# Patient Record
Sex: MALE | Race: WHITE | Employment: FULL TIME | URBAN - METROPOLITAN AREA
[De-identification: names, ages, dates, MRNs, and addresses within clinical notes are randomized per-mention and may not be internally consistent; named-entity substitution may affect disease eponyms.]

---

## 2019-07-26 LAB
AVERAGE GLUCOSE: NORMAL
CHOLESTEROL, TOTAL: 167 MG/DL
CHOLESTEROL/HDL RATIO: 4.1
CREATININE: 1 MG/DL
HBA1C MFR BLD: 5.2 %
HDLC SERPL-MCNC: 41 MG/DL (ref 35–70)
LDL CHOLESTEROL CALCULATED: 105 MG/DL (ref 0–160)
POTASSIUM (K+): 4.5
TRIGL SERPL-MCNC: 115 MG/DL
VLDLC SERPL CALC-MCNC: NORMAL MG/DL

## 2020-03-31 VITALS — HEART RATE: 82 BPM | SYSTOLIC BLOOD PRESSURE: 120 MMHG | RESPIRATION RATE: 12 BRPM | DIASTOLIC BLOOD PRESSURE: 70 MMHG

## 2021-01-04 ENCOUNTER — TELEPHONE (OUTPATIENT)
Dept: ADMINISTRATIVE | Age: 56
End: 2021-01-04

## 2021-01-04 DIAGNOSIS — M79.645 PAIN OF LEFT THUMB: Primary | ICD-10-CM

## 2021-01-04 NOTE — TELEPHONE ENCOUNTER
Pt called needing a referral to Dr. Cassie Vivar (Ortho) for injured tendon to L thumb. Pt is already scheduled but need the referral sent over to Dr. Cassie Vivar. Please advise Pt.

## 2021-01-07 DIAGNOSIS — M79.642 LEFT HAND PAIN: Primary | ICD-10-CM

## 2021-01-08 ENCOUNTER — HOSPITAL ENCOUNTER (OUTPATIENT)
Age: 56
Discharge: HOME | End: 2021-01-08
Payer: COMMERCIAL

## 2021-01-08 ENCOUNTER — OFFICE VISIT (OUTPATIENT)
Dept: ORTHOPEDIC SURGERY | Age: 56
End: 2021-01-08
Payer: COMMERCIAL

## 2021-01-08 VITALS — BODY MASS INDEX: 32.51 KG/M2 | HEIGHT: 72 IN | WEIGHT: 240 LBS | TEMPERATURE: 98 F

## 2021-01-08 DIAGNOSIS — M65.312 TRIGGER FINGER OF LEFT THUMB: Primary | ICD-10-CM

## 2021-01-08 DIAGNOSIS — M65.312 TRIGGER FINGER OF LEFT THUMB: ICD-10-CM

## 2021-01-08 PROCEDURE — U0003 INFECTIOUS AGENT DETECTION BY NUCLEIC ACID (DNA OR RNA); SEVERE ACUTE RESPIRATORY SYNDROME CORONAVIRUS 2 (SARS-COV-2) (CORONAVIRUS DISEASE [COVID-19]), AMPLIFIED PROBE TECHNIQUE, MAKING USE OF HIGH THROUGHPUT TECHNOLOGIES AS DESCRIBED BY CMS-2020-01-R: HCPCS

## 2021-01-08 PROCEDURE — 99203 OFFICE O/P NEW LOW 30 MIN: CPT | Performed by: ORTHOPAEDIC SURGERY

## 2021-01-08 NOTE — PROGRESS NOTES
Everitt Lesches is a 48 y.o. male, who presents   Chief Complaint   Patient presents with    Hand Pain     Left Hand, Thumb pain, states of pain x 6 weeks, no known injury. HPI[de-identified] Left thumb pain is been present for some time. The thumb will snap or click and is very bothersome. It will wake him up from sleep at night. He has been using his splint to keep his IP joint straight which seems to help prevent the triggering at night in the waking problems. Is right-hand dominant. He is still able to perform his job as a  for [employer]. Allergies; medications; past medical, surgical, family, and social history; and problem list have been reviewed today and updated as indicated in this encounter - see below following Ortho specifics. Musculoskeletal: Skin condition gross neurovascular function are good in the left upper extremity. Shoulder elbow and wrist motion are good. There is a prominence at the base of the thumb on the volar side in the area of the flexor tendons and the A1 band. The thumb range of motion is limited somewhat. There is tenderness palpation. There is crepitus with motion of the IP joint and the flexor tendon consistent with trigger finger. The remaining fingers are uninvolved. Radiologic Studies: Plain imaging of the left hand shows no abnormalities in the bones. ASSESSMENT:  Gilmer Penn was seen today for hand pain. Diagnoses and all orders for this visit:    Trigger finger of left thumb  -     COVID-19 Ambulatory;  Future Treatment alternatives were reviewed including medical and physical therapies, injections, and surgical options, expected risks benefits and likely outcome of each were discussed in detail, questions asked and answered and understood. We discussed the symptoms as well as physical findings and imaging results. I described the pathology of the triggering and treatment options which are injection or surgical release of the A1 band. Patient seemed to prefer the definitive treatment which she is surgical.    PLAN: Outpatient surgical flexor tenovaginotomy left thumb. The patient was counseled at length about the risks of joaquin Covid-19 during their perioperative period and any recovery window from their procedure. The patient was made aware that joaquin Covid-19  may worsen their prognosis for recovering from their procedure  and lend to a higher morbidity and/or mortality risk. All material risks, benefits, and reasonable alternatives including postponing the procedure were discussed. The patient does wish to proceed with the procedure at this time. There is no problem list on file for this patient. Past Medical History:   Diagnosis Date    Arnold-Chiari malformation ([facility] [address]     Sleep apnea        Past Surgical History:   Procedure Laterality Date    KNEE ARTHROPLASTY Left 10/2009       Current Outpatient Medications   Medication Sig Dispense Refill    ibuprofen (ADVIL;MOTRIN) 200 MG tablet Take 400 mg by mouth every 6 hours as needed for Pain.  ibuprofen (IBU) 800 MG tablet Take 1 tablet by mouth every 8 hours as needed for Pain for 10 days. 30 tablet 0    ibuprofen (IBU) 600 MG tablet Take 1 PO Q 6-8 hrs prn Pain / Swelling / Fever. Take WITH food / meals. 40 tablet 0     No current facility-administered medications for this visit.         No Known Allergies    Social History     Socioeconomic History    Marital status:      Spouse name: None  Number of children: None    Years of education: None    Highest education level: None   Occupational History    None   Social Needs    Financial resource strain: None    Food insecurity     Worry: None     Inability: None    Transportation needs     Medical: None     Non-medical: None   Tobacco Use    Smoking status: Former Smoker     Quit date:      Years since quittin.0    Smokeless tobacco: Former User     Types: Chew   Substance and Sexual Activity    Alcohol use: No    Drug use: No    Sexual activity: Yes     Partners: Female   Lifestyle    Physical activity     Days per week: None     Minutes per session: None    Stress: None   Relationships    Social connections     Talks on phone: None     Gets together: None     Attends [employer] service: None     Active member of club or organization: None     Attends meetings of clubs or organizations: None     Relationship status: None    Intimate partner violence     Fear of current or ex partner: None     Emotionally abused: None     Physically abused: None     Forced sexual activity: None   Other Topics Concern    None   Social History Narrative    None       Family History   Problem Relation Age of Onset    Pancreatic Cancer Mother     No Known Problems Father     Diabetes Brother          Review of Systems:   As follows except as previously noted in HPI:  Constitutional: Negative for chills, diaphoresis,  fever   Respiratory: Negative for cough, shortness of breath and wheezing. Cardiovascular: Negative for chest pain and palpitations. Neurological: Negative for dizziness, syncope,   GI / : abdominal pain or cramping  Musculoskeletal: see HPI       Objective:   Physical Exam   Constitutional: Oriented to person, place, and time. and appears well-developed and well-nourished. :   Head: Normocephalic and atraumatic. Neck: Neck supple.   Eyes: EOM are normal. Pulmonary/Chest: Effort normal.  No respiratory distress, no wheezes. Neurological: Alert and oriented to person  Skin: Skin is warm and dry. Melinda Salinas DO    1/8/21  11:21 AM    All reasonable efforts have been made to minimize the risk of errors that may occur in the use of voice recognition and other electronic means of charting.

## 2021-01-09 LAB
SARS-COV-2: NOT DETECTED
SOURCE: NORMAL

## 2021-01-13 ENCOUNTER — ANESTHESIA EVENT (OUTPATIENT)
Dept: OPERATING ROOM | Age: 56
End: 2021-01-13
Payer: COMMERCIAL

## 2021-01-13 NOTE — ANESTHESIA PRE PROCEDURE
Department of Anesthesiology  Preprocedure Note       Name:  Addie Anguiano   Age:  48 y.o.  :  1965                                          MRN:  [id number]         Date:  2021      Surgeon: Alisha Edwards):  HANS Soto DO    Procedure: Procedure(s):  TRIGGER THUMB-LEFT    Medications prior to admission:   Prior to Admission medications    Not on File       Current medications:    No current facility-administered medications for this encounter. No current outpatient medications on file. Allergies:  No Known Allergies    Problem List:  There is no problem list on file for this patient. Past Medical History:        Diagnosis Date    Arnold-Chiari malformation ([address]     had correction surgery age 14    Sleep apnea     uses CPAP       Past Surgical History:        Procedure Laterality Date    COLONOSCOPY      x2    KNEE ARTHROSCOPY Left 10/2009    OTHER SURGICAL HISTORY  age 14    arnold-chiari malformation correction surgery    TONSILLECTOMY         Social History:    Social History     Tobacco Use    Smoking status: Former Smoker     Years: 10.00     Types: Cigarettes     Quit date:      Years since quittin.0    Smokeless tobacco: Former User     Types: Chew   Substance Use Topics    Alcohol use: No                                Counseling given: Not Answered      Vital Signs (Current):   Vitals:    21 1334   Weight: 240 lb (108.9 kg)   Height: 6' (1.829 m)                                              BP Readings from Last 3 Encounters:   19 120/70   17 131/70   17 131/70       NPO Status:                                                                                 BMI:   Wt Readings from Last 3 Encounters:   21 240 lb (108.9 kg)   17 254 lb 12.8 oz (115.6 kg)   17 240 lb (108.9 kg)     Body mass index is 32.55 kg/m².     CBC: No results found for: WBC, RBC, HGB, HCT, MCV, RDW, PLT    CMP:   Lab Results Component Value Date    K 4.5 07/26/2019    CREATININE 1.0 07/26/2019       POC Tests: No results for input(s): POCGLU, POCNA, POCK, POCCL, POCBUN, POCHEMO, POCHCT in the last 72 hours. Coags: No results found for: PROTIME, INR, APTT    HCG (If Applicable): No results found for: PREGTESTUR, PREGSERUM, HCG, HCGQUANT     ABGs: No results found for: PHART, PO2ART, XQA3HXT, ZRP4ETJ, BEART, G4NVVJKN     Type & Screen (If Applicable):  No results found for: LABABO, LABRH    Drug/Infectious Status (If Applicable):  No results found for: HIV, HEPCAB    COVID-19 Screening (If Applicable):   Lab Results   Component Value Date    COVID19 Not Detected 01/08/2021         Anesthesia Evaluation  Patient summary reviewed and Nursing notes reviewed no history of anesthetic complications:   Airway: Mallampati: III  TM distance: >3 FB   Neck ROM: full  Mouth opening: > = 3 FB Dental:          Pulmonary:normal exam  breath sounds clear to auscultation  (+) sleep apnea: on CPAP,                            ROS comment: Hx. Of tobacco abuse w/o dx. Of COPD or need of medical therapy   Cardiovascular:  Exercise tolerance: good (>4 METS),   (+) murmur,       ECG reviewed  Rhythm: regular  Rate: normal           Beta Blocker:  Not on Beta Blocker      ROS comment: Pt. Denies anginal symptoms, respiratory difficulties, major physical restrictions or any recent changes in functional capacity and is at baseline. Neuro/Psych:   (+) seizures: well controlled and no interval change,              ROS comment: Arnold-Chiari malformation s/p repair when 14years old complicated by vocal cord paralysis necessitating tracheostomy. Denies residual sequelae. GI/Hepatic/Renal: Neg GI/Hepatic/Renal ROS            Endo/Other: Negative Endo/Other ROS             Pt had no PAT visit       Abdominal:           Vascular: negative vascular ROS.                                      Anesthesia Plan      MAC and regional     ASA 2     (PONV prophylaxis C-19 negative)  Induction: intravenous. MIPS: Postoperative opioids intended and Prophylactic antiemetics administered. Anesthetic plan and risks discussed with patient. Plan discussed with CRNA. Kelsie Robert DO   1/13/2021        Hx. From chart review.   Patient to be seen and examined tomorrow by the DOS anesthesiologist.    Kelsie Robert DO  Staff Anesthesiologist  January 13, 2021  7:17 AM

## 2021-01-14 ENCOUNTER — HOSPITAL ENCOUNTER (OUTPATIENT)
Age: 56
Setting detail: OUTPATIENT SURGERY
Discharge: HOME | End: 2021-01-14
Attending: ORTHOPAEDIC SURGERY | Admitting: ORTHOPAEDIC SURGERY
Payer: COMMERCIAL

## 2021-01-14 ENCOUNTER — ANESTHESIA (OUTPATIENT)
Dept: OPERATING ROOM | Age: 56
End: 2021-01-14
Payer: COMMERCIAL

## 2021-01-14 VITALS
DIASTOLIC BLOOD PRESSURE: 73 MMHG | OXYGEN SATURATION: 95 % | HEIGHT: 72 IN | WEIGHT: 241 LBS | RESPIRATION RATE: 14 BRPM | BODY MASS INDEX: 32.64 KG/M2 | HEART RATE: 74 BPM | TEMPERATURE: 96.6 F | SYSTOLIC BLOOD PRESSURE: 125 MMHG

## 2021-01-14 VITALS
OXYGEN SATURATION: 96 % | SYSTOLIC BLOOD PRESSURE: 111 MMHG | DIASTOLIC BLOOD PRESSURE: 70 MMHG | RESPIRATION RATE: 16 BRPM

## 2021-01-14 DIAGNOSIS — M65.312 TRIGGER FINGER OF LEFT THUMB: Primary | ICD-10-CM

## 2021-01-14 PROCEDURE — 2709999900 HC NON-CHARGEABLE SUPPLY: Performed by: ORTHOPAEDIC SURGERY

## 2021-01-14 PROCEDURE — 26055 INCISE FINGER TENDON SHEATH: CPT | Performed by: ORTHOPAEDIC SURGERY

## 2021-01-14 PROCEDURE — 7100000010 HC PHASE II RECOVERY - FIRST 15 MIN: Performed by: ORTHOPAEDIC SURGERY

## 2021-01-14 PROCEDURE — 6360000002 HC RX W HCPCS: Performed by: NURSE ANESTHETIST, CERTIFIED REGISTERED

## 2021-01-14 PROCEDURE — 3600000002 HC SURGERY LEVEL 2 BASE: Performed by: ORTHOPAEDIC SURGERY

## 2021-01-14 PROCEDURE — 2500000003 HC RX 250 WO HCPCS: Performed by: NURSE ANESTHETIST, CERTIFIED REGISTERED

## 2021-01-14 PROCEDURE — 3600000012 HC SURGERY LEVEL 2 ADDTL 15MIN: Performed by: ORTHOPAEDIC SURGERY

## 2021-01-14 PROCEDURE — 2580000003 HC RX 258: Performed by: ANESTHESIOLOGY

## 2021-01-14 PROCEDURE — 3700000000 HC ANESTHESIA ATTENDED CARE: Performed by: ORTHOPAEDIC SURGERY

## 2021-01-14 PROCEDURE — 7100000011 HC PHASE II RECOVERY - ADDTL 15 MIN: Performed by: ORTHOPAEDIC SURGERY

## 2021-01-14 PROCEDURE — 3700000001 HC ADD 15 MINUTES (ANESTHESIA): Performed by: ORTHOPAEDIC SURGERY

## 2021-01-14 RX ORDER — METOCLOPRAMIDE HYDROCHLORIDE 5 MG/ML
10 INJECTION INTRAMUSCULAR; INTRAVENOUS
Status: DISCONTINUED | OUTPATIENT
Start: 2021-01-14 | End: 2021-01-14 | Stop reason: HOSPADM

## 2021-01-14 RX ORDER — LIDOCAINE HYDROCHLORIDE 5 MG/ML
INJECTION, SOLUTION INFILTRATION; INTRAVENOUS PRN
Status: DISCONTINUED | OUTPATIENT
Start: 2021-01-14 | End: 2021-01-14 | Stop reason: SDUPTHER

## 2021-01-14 RX ORDER — MEPERIDINE HYDROCHLORIDE 25 MG/ML
12.5 INJECTION INTRAMUSCULAR; INTRAVENOUS; SUBCUTANEOUS EVERY 5 MIN PRN
Status: DISCONTINUED | OUTPATIENT
Start: 2021-01-14 | End: 2021-01-14 | Stop reason: HOSPADM

## 2021-01-14 RX ORDER — ACETAMINOPHEN AND CODEINE PHOSPHATE 300; 30 MG/1; MG/1
1 TABLET ORAL EVERY 4 HOURS PRN
Qty: 30 TABLET | Refills: 0 | Status: SHIPPED | OUTPATIENT
Start: 2021-01-14 | End: 2021-01-21

## 2021-01-14 RX ORDER — PROPOFOL 10 MG/ML
INJECTION, EMULSION INTRAVENOUS CONTINUOUS PRN
Status: DISCONTINUED | OUTPATIENT
Start: 2021-01-14 | End: 2021-01-14 | Stop reason: SDUPTHER

## 2021-01-14 RX ORDER — PROCHLORPERAZINE EDISYLATE 5 MG/ML
5 INJECTION INTRAMUSCULAR; INTRAVENOUS
Status: DISCONTINUED | OUTPATIENT
Start: 2021-01-14 | End: 2021-01-14 | Stop reason: HOSPADM

## 2021-01-14 RX ORDER — FENTANYL CITRATE 50 UG/ML
INJECTION, SOLUTION INTRAMUSCULAR; INTRAVENOUS PRN
Status: DISCONTINUED | OUTPATIENT
Start: 2021-01-14 | End: 2021-01-14 | Stop reason: SDUPTHER

## 2021-01-14 RX ORDER — LIDOCAINE HYDROCHLORIDE 20 MG/ML
INJECTION, SOLUTION INTRAVENOUS PRN
Status: DISCONTINUED | OUTPATIENT
Start: 2021-01-14 | End: 2021-01-14 | Stop reason: SDUPTHER

## 2021-01-14 RX ORDER — SODIUM CHLORIDE, SODIUM LACTATE, POTASSIUM CHLORIDE, CALCIUM CHLORIDE 600; 310; 30; 20 MG/100ML; MG/100ML; MG/100ML; MG/100ML
INJECTION, SOLUTION INTRAVENOUS CONTINUOUS
Status: DISCONTINUED | OUTPATIENT
Start: 2021-01-14 | End: 2021-01-14 | Stop reason: HOSPADM

## 2021-01-14 RX ORDER — DIPHENHYDRAMINE HYDROCHLORIDE 50 MG/ML
12.5 INJECTION INTRAMUSCULAR; INTRAVENOUS
Status: DISCONTINUED | OUTPATIENT
Start: 2021-01-14 | End: 2021-01-14 | Stop reason: HOSPADM

## 2021-01-14 RX ORDER — FENTANYL CITRATE 50 UG/ML
25 INJECTION, SOLUTION INTRAMUSCULAR; INTRAVENOUS EVERY 5 MIN PRN
Status: DISCONTINUED | OUTPATIENT
Start: 2021-01-14 | End: 2021-01-14 | Stop reason: HOSPADM

## 2021-01-14 RX ORDER — MIDAZOLAM HYDROCHLORIDE 1 MG/ML
INJECTION INTRAMUSCULAR; INTRAVENOUS PRN
Status: DISCONTINUED | OUTPATIENT
Start: 2021-01-14 | End: 2021-01-14 | Stop reason: SDUPTHER

## 2021-01-14 RX ORDER — HYDROCODONE BITARTRATE AND ACETAMINOPHEN 5; 325 MG/1; MG/1
1 TABLET ORAL
Status: DISCONTINUED | OUTPATIENT
Start: 2021-01-14 | End: 2021-01-14 | Stop reason: HOSPADM

## 2021-01-14 RX ADMIN — PROPOFOL INJECTABLE EMULSION 100 MCG/KG/MIN: 10 INJECTION, EMULSION INTRAVENOUS at 08:29

## 2021-01-14 RX ADMIN — SODIUM CHLORIDE, POTASSIUM CHLORIDE, SODIUM LACTATE AND CALCIUM CHLORIDE: 600; 310; 30; 20 INJECTION, SOLUTION INTRAVENOUS at 07:48

## 2021-01-14 RX ADMIN — FENTANYL CITRATE 50 MCG: 50 INJECTION, SOLUTION INTRAMUSCULAR; INTRAVENOUS at 08:35

## 2021-01-14 RX ADMIN — LIDOCAINE HYDROCHLORIDE 50 ML: 5 INJECTION, SOLUTION INFILTRATION; INTRAVENOUS at 08:29

## 2021-01-14 RX ADMIN — MIDAZOLAM 2 MG: 1 INJECTION INTRAMUSCULAR; INTRAVENOUS at 08:27

## 2021-01-14 RX ADMIN — LIDOCAINE HYDROCHLORIDE 50 MG: 20 INJECTION, SOLUTION INTRAVENOUS at 08:29

## 2021-01-14 RX ADMIN — FENTANYL CITRATE 50 MCG: 50 INJECTION, SOLUTION INTRAMUSCULAR; INTRAVENOUS at 08:29

## 2021-01-14 ASSESSMENT — PAIN SCALES - GENERAL: PAINLEVEL_OUTOF10: 0

## 2021-01-14 ASSESSMENT — PAIN DESCRIPTION - DESCRIPTORS: DESCRIPTORS: ACHING

## 2021-01-14 NOTE — OP NOTE
Operative report        DATE OF PROCEDURE: 1/14/2021     SURGEON: Zachery Gomez    ASSISTANT: None    PREOPERATIVE DIAGNOSIS: Trigger finger left thumb    POSTOPERATIVE DIAGNOSIS: Same    OPERATION: Flexor tenovaginotomy left thumb    ANESTHESIA: IV regional    ESTIMATED BLOOD LOSS: Scant    COMPLICATIONS: None    SPECIMENS: Was sent to pathology    HISTORY: The patient is a 48y.o. year old male with history of above preop diagnosis. I explained the risk, benefits, expected outcome, and alternatives to the procedure. Patient understands and is in agreement. PROCEDURE: The patient was brought the operating room after signs identified. Adequate IV regional anesthetic was administered by anesthesia with the patient supine on the operating table. The left upper extremities and prepped and draped sterile fashion. Incision was made transverse in the flexor crease of the thumb MP joint. This was deepened down through subcu tissue. 2 and half power loupe modification was used throughout the procedure. The neurovascular structures were carefully protected as was the flexor tendon. Incision was made in the A1 band with a scalpel down to the flexor tendon. This opened up the sheath proximally. The A1 band was then released completely throughout its length to the point of its distal extent and release of fluid indicated the sheath had been decompressed as well. The tendon was then brought up out of its bed to ensure complete excursion and and replaced. The wound was then thoroughly irrigated and closed with 6-0 Prolene simple erupted skin sutures. Xeroform gauze and a bulky dressing was applied. The tourniquet was deflated and good circulation turned to the upper extremity. Patient was taken recovery in stable condition. GROSS PATHOLOGY: Thick tight A1 band flexor system left thumb with stenosis of the tendon. Excess of tenosynovial fluid confirming inflammation in the area.     All reasonable efforts

## 2021-01-14 NOTE — H&P
History and Physical      CHIEF COMPLAINT: Left thumb catching and pain    HISTORY OF PRESENT ILLNESS:      Worsening over time    Past Medical History:        Diagnosis Date    Arnold-Chiari malformation ([address]     had correction surgery age 14    Sleep apnea     uses CPAP     Past Surgical History:        Procedure Laterality Date    COLONOSCOPY      x2    KNEE ARTHROSCOPY Left 10/2009    OTHER SURGICAL HISTORY  age 14    arnold-chiari malformation correction surgery    TONSILLECTOMY       Social History:    TOBACCO:   reports that he quit smoking about 23 years ago. His smoking use included cigarettes. He quit after 10.00 years of use. He has quit using smokeless tobacco.  His smokeless tobacco use included chew. ETOH:   reports no history of alcohol use. DRUGS:   reports no history of drug use. Family History:       Problem Relation Age of Onset    Pancreatic Cancer Mother     No Known Problems Father     Diabetes Brother      Medications Prior to Admission:  No medications prior to admission. Allergies:  Patient has no known allergies. CONSTITUTIONAL:  negative for  chills and anorexia  HEENT:  negative for  tinnitus  RESPIRATORY:  negative for  dyspnea and cyanosis  CARDIOVASCULAR:  negative for  palpitations, syncope  GASTROINTESTINAL:  negative for vomiting and hemtochezia  GENITOURINARY:  negative for dysuria  ENDOCRINE:  negative for tremor  MUSCULOSKELETAL:  positive for snapping flexor tendon left thumb  NEUROLOGICAL:  negative for seizures and syncope  BEHAVIOR/PSYCH:  negative for agitated and anxiety    PHYSICAL EXAM:  Ht 6' (1.829 m)   Wt 240 lb (108.9 kg)   BMI 32.55 kg/m²   General appearance:  awake, alert, cooperative, no apparent distress, and appears stated age  Neurologic:  Awake, alert, oriented to name, place and time. Cranial nerves II-XII are grossly intact. Motor is 5 out of 5 bilaterally. Cerebellar finger to nose, heel to shin intact. Sensory is intact.   Babinski down going, Romberg negative, and gait is normal.  Lungs:  No increased work of breathing, good air exchange, clear to auscultation bilaterally, no crackles or wheezing  Heart:  Normal apical impulse, regular rate and rhythm, normal S1 and S2, no S3 or S4, and no murmur noted  Abdomen:  normal bowel sounds  Skin: warm and dry, no rash or erythema  ENT: tympanic membrane, external ear and ear canal normal bilaterally, oropharynx clear and moist with normal mucous membranes  Musculoskeletal: Felton flexor tendon left thumb    General Labs:  CBC: No results found for: WBC, RBC, HGB, HCT, MCV, RDW, PLT  CMP:    Lab Results   Component Value Date    K 4.5 07/26/2019     U/A:  No components found for: Sunday Dias, USPGRAV, UPH, UPROTEIN, UGLUCOSE, UKETONE, UBILI, UBLOOD, [place], UUROBIL, [place], [facility], [place], [facility], [place], [facility], [place]    Radiology: None    ASSESSMENT AND PLAN:    Flexor tenovaginotomy left thumb      Electronically signed by Mario Nichols DO on 1/14/2021 at 7:22 AM

## 2021-01-14 NOTE — ANESTHESIA POSTPROCEDURE EVALUATION
Department of Anesthesiology  Postprocedure Note    Patient: Nallely Ram  MRN: [id number]  YOB: 1965  Date of evaluation: 1/14/2021  Time:  9:13 AM     Procedure Summary     Date: 01/14/21 Room / Location: [address] [place] [address] 01 / [facility]    Anesthesia Start: Yolie Rashid Anesthesia Stop: 0901    Procedure: TRIGGER THUMB-LEFT (Left ) Diagnosis: (LEFT THUMB-TRIGGER THUMB)    Surgeons: Marco Alcala DO Responsible Provider: Cole Bosworth, DO    Anesthesia Type: MAC, regional ASA Status: 2          Anesthesia Type: MAC, regional    Keysha Phase I: Keysha Score: 10    Keysha Phase II: Keysha Score: 9    Last vitals: Reviewed and per EMR flowsheets.        Anesthesia Post Evaluation    Patient location during evaluation: bedside  Patient participation: complete - patient participated  Level of consciousness: awake  Pain score: 2  Airway patency: patent  Nausea & Vomiting: no vomiting and no nausea  Complications: no  Cardiovascular status: hemodynamically stable  Respiratory status: acceptable  Hydration status: stable

## 2021-01-25 ENCOUNTER — OFFICE VISIT (OUTPATIENT)
Dept: ORTHOPEDIC SURGERY | Age: 56
End: 2021-01-25

## 2021-01-25 VITALS — BODY MASS INDEX: 31.94 KG/M2 | HEIGHT: 73 IN | WEIGHT: 241 LBS | TEMPERATURE: 98 F

## 2021-01-25 DIAGNOSIS — M65.312 TRIGGER FINGER OF LEFT THUMB: Primary | ICD-10-CM

## 2021-01-25 PROCEDURE — 99024 POSTOP FOLLOW-UP VISIT: CPT | Performed by: ORTHOPAEDIC SURGERY

## 2021-01-25 NOTE — PROGRESS NOTES
Chief Complaint:   Chief Complaint   Patient presents with    Post-Op Check     left thumb trigger release DOS 1/14/21. doing well        Jordan Hernandes is 11 days postop left thumb trigger finger decompression. He is doing very well. He still has a lump there and little tenderness and swelling. Been no other problems. Allergies; medications; past medical, surgical, family, and social history; and problem list have been reviewed today and updated as indicated in this encounter seen below. Exam: The wound is well approximated. Sutures removed today without incident. Range of motion of the fingers good without catching. Is mild edema in the area as expected. It was explained to him that the prominence in the tendon will gradually decrease and the lump should go away. Radiographs: none    ASSESSMENT:    Demarco Go was seen today for post-op check. Diagnoses and all orders for this visit:    Trigger finger of left thumb        PLAN: Protection of the wound for at least a week and a half with avoidance of strenuous activities to prevent irritation or any wound separation. All looks good now and should continue with proper care. We will follow-up as needed. No follow-ups on file. No current outpatient medications on file. No current facility-administered medications for this visit.         Patient Active Problem List   Diagnosis    Trigger finger of left thumb       Past Medical History:   Diagnosis Date    Arnold-Chiari malformation ([facility] [address]     had correction surgery age 14    Sleep apnea     uses CPAP       Past Surgical History:   Procedure Laterality Date    COLONOSCOPY      x2    FINGER TRIGGER RELEASE Left 01/14/2021    FINGER TRIGGER RELEASE Left 1/14/2021    TRIGGER THUMB-LEFT performed by Michele Michael DO at [address] ARTHROSCOPY Left 10/2009    OTHER SURGICAL HISTORY  age 14    arnold-chiari malformation correction surgery    TONSILLECTOMY No Known Allergies    Social History     Socioeconomic History    Marital status:      Spouse name: None    Number of children: None    Years of education: None    Highest education level: None   Occupational History    None   Social Needs    Financial resource strain: None    Food insecurity     Worry: None     Inability: None    Transportation needs     Medical: None     Non-medical: None   Tobacco Use    Smoking status: Former Smoker     Years: 10.00     Types: Cigarettes     Quit date:      Years since quittin.0    Smokeless tobacco: Former User     Types: Chew   Substance and Sexual Activity    Alcohol use: No    Drug use: No    Sexual activity: Yes     Partners: Female   Lifestyle    Physical activity     Days per week: None     Minutes per session: None    Stress: None   Relationships    Social connections     Talks on phone: None     Gets together: None     Attends Quaker service: None     Active member of club or organization: None     Attends meetings of clubs or organizations: None     Relationship status: None    Intimate partner violence     Fear of current or ex partner: None     Emotionally abused: None     Physically abused: None     Forced sexual activity: None   Other Topics Concern    None   Social History Narrative    None       Review of Systems  As follows except as previously noted in HPI:  Constitutional: Negative for chills, diaphoresis, fatigue, fever and unexpected weight change. Respiratory: Negative for cough, shortness of breath and wheezing. Cardiovascular: Negative for chest pain and palpitations. Neurological: Negative for dizziness, syncope, cephalgia. GI / : negative  Musculoskeletal: see HPI       Objective:   Physical Exam   Constitutional: Oriented to person, place, and time. and appears well-developed and well-nourished. :   Head: Normocephalic and atraumatic. Eyes: EOM are normal.   Neck: Neck supple.

## 2021-09-16 ENCOUNTER — HOSPITAL ENCOUNTER (OUTPATIENT)
Age: 56
Discharge: HOME | End: 2021-09-16
Payer: COMMERCIAL

## 2021-09-16 ENCOUNTER — HOSPITAL ENCOUNTER (OUTPATIENT)
Age: 56
Discharge: HOME | End: 2021-09-18
Payer: COMMERCIAL

## 2021-09-16 ENCOUNTER — HOSPITAL ENCOUNTER (OUTPATIENT)
Dept: GENERAL RADIOLOGY | Age: 56
Discharge: HOME | End: 2021-09-18
Payer: COMMERCIAL

## 2021-09-16 DIAGNOSIS — Z72.0 TOBACCO ABUSE: ICD-10-CM

## 2021-09-16 LAB
ALBUMIN SERPL-MCNC: 4.7 G/DL (ref 3.5–5.2)
ALP BLD-CCNC: 91 U/L (ref 40–129)
ALT SERPL-CCNC: 26 U/L (ref 0–40)
ANION GAP SERPL CALCULATED.3IONS-SCNC: 7 MMOL/L (ref 7–16)
AST SERPL-CCNC: 19 U/L (ref 0–39)
BASOPHILS ABSOLUTE: 0.03 E9/L (ref 0–0.2)
BASOPHILS RELATIVE PERCENT: 0.7 % (ref 0–2)
BILIRUB SERPL-MCNC: 0.4 MG/DL (ref 0–1.2)
BUN BLDV-MCNC: 15 MG/DL (ref 6–20)
CALCIUM SERPL-MCNC: 9.2 MG/DL (ref 8.6–10.2)
CHLORIDE BLD-SCNC: 107 MMOL/L (ref 98–107)
CHOLESTEROL, TOTAL: 202 MG/DL (ref 0–199)
CO2: 30 MMOL/L (ref 22–29)
CREAT SERPL-MCNC: 0.9 MG/DL (ref 0.7–1.2)
EOSINOPHILS ABSOLUTE: 0.16 E9/L (ref 0.05–0.5)
EOSINOPHILS RELATIVE PERCENT: 3.8 % (ref 0–6)
GFR AFRICAN AMERICAN: >60
GFR NON-AFRICAN AMERICAN: >60 ML/MIN/1.73
GLUCOSE BLD-MCNC: 115 MG/DL (ref 74–99)
HCT VFR BLD CALC: 48.5 % (ref 37–54)
HDLC SERPL-MCNC: 48 MG/DL
HEMOGLOBIN: 16.4 G/DL (ref 12.5–16.5)
IMMATURE GRANULOCYTES #: 0.01 E9/L
IMMATURE GRANULOCYTES %: 0.2 % (ref 0–5)
LDL CHOLESTEROL CALCULATED: 138 MG/DL (ref 0–99)
LYMPHOCYTES ABSOLUTE: 1.62 E9/L (ref 1.5–4)
LYMPHOCYTES RELATIVE PERCENT: 38 % (ref 20–42)
MCH RBC QN AUTO: 30.1 PG (ref 26–35)
MCHC RBC AUTO-ENTMCNC: 33.8 % (ref 32–34.5)
MCV RBC AUTO: 89.2 FL (ref 80–99.9)
MONOCYTES ABSOLUTE: 0.4 E9/L (ref 0.1–0.95)
MONOCYTES RELATIVE PERCENT: 9.4 % (ref 2–12)
NEUTROPHILS ABSOLUTE: 2.04 E9/L (ref 1.8–7.3)
NEUTROPHILS RELATIVE PERCENT: 47.9 % (ref 43–80)
PDW BLD-RTO: 12.6 FL (ref 11.5–15)
PLATELET # BLD: 191 E9/L (ref 130–450)
PMV BLD AUTO: 9.1 FL (ref 7–12)
POTASSIUM SERPL-SCNC: 4.8 MMOL/L (ref 3.5–5)
RBC # BLD: 5.44 E12/L (ref 3.8–5.8)
SODIUM BLD-SCNC: 144 MMOL/L (ref 132–146)
T4 TOTAL: 6.8 MCG/DL (ref 4.5–11.7)
TOTAL PROTEIN: 6.9 G/DL (ref 6.4–8.3)
TRIGL SERPL-MCNC: 79 MG/DL (ref 0–149)
TSH SERPL DL<=0.05 MIU/L-ACNC: 2.23 UIU/ML (ref 0.27–4.2)
VLDLC SERPL CALC-MCNC: 16 MG/DL
WBC # BLD: 4.3 E9/L (ref 4.5–11.5)

## 2021-09-16 PROCEDURE — 80061 LIPID PANEL: CPT

## 2021-09-16 PROCEDURE — 36415 COLL VENOUS BLD VENIPUNCTURE: CPT

## 2021-09-16 PROCEDURE — 85025 COMPLETE CBC W/AUTO DIFF WBC: CPT

## 2021-09-16 PROCEDURE — 84443 ASSAY THYROID STIM HORMONE: CPT

## 2021-09-16 PROCEDURE — 71046 X-RAY EXAM CHEST 2 VIEWS: CPT

## 2021-09-16 PROCEDURE — 84436 ASSAY OF TOTAL THYROXINE: CPT

## 2021-09-16 PROCEDURE — 80053 COMPREHEN METABOLIC PANEL: CPT

## 2021-10-25 ENCOUNTER — HOSPITAL ENCOUNTER (OUTPATIENT)
Age: 56
Discharge: HOME | End: 2021-10-25
Payer: COMMERCIAL

## 2021-10-25 LAB
CHOLESTEROL, FASTING: 204 MG/DL (ref 0–199)
GLUCOSE FASTING: 100 MG/DL (ref 74–99)
HBA1C MFR BLD: 5.2 % (ref 4–5.6)
HDLC SERPL-MCNC: 40 MG/DL
LDL CHOLESTEROL CALCULATED: 136 MG/DL (ref 0–99)
TRIGLYCERIDE, FASTING: 139 MG/DL (ref 0–149)
VLDLC SERPL CALC-MCNC: 28 MG/DL

## 2021-10-25 PROCEDURE — 82947 ASSAY GLUCOSE BLOOD QUANT: CPT

## 2021-10-25 PROCEDURE — 83036 HEMOGLOBIN GLYCOSYLATED A1C: CPT

## 2021-10-25 PROCEDURE — 36415 COLL VENOUS BLD VENIPUNCTURE: CPT

## 2021-10-25 PROCEDURE — 80061 LIPID PANEL: CPT

## 2024-08-12 ENCOUNTER — OFFICE VISIT (OUTPATIENT)
Dept: ORTHOPEDIC SURGERY | Age: 59
End: 2024-08-12
Payer: COMMERCIAL

## 2024-08-12 VITALS — WEIGHT: 225 LBS | HEIGHT: 72 IN | BODY MASS INDEX: 30.48 KG/M2

## 2024-08-12 DIAGNOSIS — M25.552 CHRONIC LEFT HIP PAIN: Primary | ICD-10-CM

## 2024-08-12 DIAGNOSIS — M25.552 LEFT HIP PAIN: Primary | ICD-10-CM

## 2024-08-12 DIAGNOSIS — M47.818 ARTHRITIS OF LEFT SACROILIAC JOINT: ICD-10-CM

## 2024-08-12 DIAGNOSIS — G89.29 CHRONIC LEFT HIP PAIN: Primary | ICD-10-CM

## 2024-08-12 PROCEDURE — 99203 OFFICE O/P NEW LOW 30 MIN: CPT | Performed by: ORTHOPAEDIC SURGERY

## 2024-08-12 NOTE — PROGRESS NOTES
Carly Sibley is a 59 y.o. male, who presents   Chief Complaint   Patient presents with    Hip Pain     Left Hip, x couple months, no known recent injury, no previous Left Hip or low back surgery.  States of pain with rotation of the hip.       HPI:: Left hip area pains been present for years overall with worsening over time.  Mr. Sibley indicates his buttock area.  He says riding the car is bad.  Sitting for very long and then moving about afterwards is difficult.  He has some problems on stairs.  Walking itself is not bad.  He is treated with some stretching and is taken some anti-inflammatories, Tylenol and aspirin on occasions    Allergies; medications; past medical, surgical, family, and social history; and problem list have been reviewed today and updated as indicated in this encounter - see below following Ortho specifics.    Musculoskeletal: .  Skin condition and gross neurovascular functions good in the left lower extremity.  Gait is balanced at a slow pace.  He has little difficulty with single-leg stance on the left.  Range of motion of his knee is good with stable collateral cruciate ligaments.  There is no pain associated.    Left hip range of motion is good and grossly equal to that on the right.  There is mild discomfort.  There is more tenderness to palpation around the lower sacroiliac joint with very mild tenderness over the greater trochanter.  There is no palpable or audible crepitus.    Radiologic Studies: X-rays of the left hip and 2 views shows good bony density, the joint space is well-maintained with no hypertrophic marginal changes femoral head or acetabulum.  There is slight density alteration in the cephalad acetabulum and the subchondral bone however this is felt to be normal variation for him.  The sacroiliac joints are not visualized on these films.    ASSESSMENT:  Carly was seen today for hip pain.    Diagnoses and all orders for this visit:    Chronic left hip pain     Treatment

## 2024-10-01 ENCOUNTER — EVALUATION (OUTPATIENT)
Dept: PHYSICAL THERAPY | Age: 59
End: 2024-10-01
Payer: COMMERCIAL

## 2024-10-01 DIAGNOSIS — M25.552 CHRONIC LEFT HIP PAIN: Primary | ICD-10-CM

## 2024-10-01 DIAGNOSIS — G89.29 CHRONIC LEFT HIP PAIN: Primary | ICD-10-CM

## 2024-10-01 PROCEDURE — 97163 PT EVAL HIGH COMPLEX 45 MIN: CPT | Performed by: PHYSICAL THERAPIST

## 2024-10-01 NOTE — PROGRESS NOTES
index is 30.52 kg/m² as calculated from the following:    Height as of 8/12/24: 1.829 m (6').    Weight as of 8/12/24: 102.1 kg (225 lb).     Observations: well nourished male, Class 1 obesity (BMI of 30 to < 35), normal affect      Inspection: normal orthopedic exam    Edema: none       Gait: wide-based    Joint/Motion:    Trunk:  WNL did not reproduce pain    Sensation: intact to light touch bilateral LE    Strength: 5/5 in bilateral LE    Hip:   Right: Flexion 5/5,  Extension 5/5  Left: Flexion 5-/5,  Extension 5-/5    Palpation:  L SI and L PSIS    Special Tests/Functional Screens:    [x]SLR []+ / [x] -    [] Anterior Drawer []+ / [] -   [] Valgus Stress []+ / [] -  [] Thessaly Test []+ / [] -   [] Tam's Sign: []+ / [] -  [] Other: []+ / [] - [] Bounce Home []+ / [] -   [] Brittni []+ / [] -   [] Pivot Shift []+ / [] -   [] Posterior Drawer []+ / [] -   [] Varus Stress []+ / [] -        Special test comments:       ASSESSMENT       Problems:   Pain 7/10 intermittent  Strength decreased   Limitations with use of left lower extremity      [x] There are no barriers affecting plan of care or recovery    [] Barriers to this patient's plan of care or recovery include:     Domestic Concerns:  [x] No  [] Yes:    Short Term goals (2-3 weeks)  Pain 3/10    Long Term goals (4-6 weeks)  Pain 1/10  Strength 5/5  Able to perform / complete the following functions / tasks: reach / lift / carry medium weighted items in performance of home or work demands  Independent with home exercise program (HEP)    Rehab Potential: [x] Good  [] Fair  [] Poor    PLAN       Treatment Plan:   instruction in home exercise program   therapeutic exercise   therapeutic activity   neuromuscular re-education   manual therapy   electrical stimulation     The following CPT codes are likely to be used in the care of this patient:   93874 PT Evaluation: High Complexity   69529 Therapeutic Exercise   88087 Neuromuscular Re-Education   92358

## 2024-10-03 ENCOUNTER — TREATMENT (OUTPATIENT)
Dept: PHYSICAL THERAPY | Age: 59
End: 2024-10-03

## 2024-10-03 DIAGNOSIS — G89.29 CHRONIC LEFT HIP PAIN: Primary | ICD-10-CM

## 2024-10-03 DIAGNOSIS — M25.552 CHRONIC LEFT HIP PAIN: Primary | ICD-10-CM

## 2024-10-03 NOTE — PROGRESS NOTES
Physical Therapy Treatment Note     Date: 10/3/2024  Patient Name: Carly Sibley  : 1965            MRN: [id number]  DOInjury:  insidious onset  DOSx: -  Referring Provider: HANS Hanson [place] [address]                                      Medical Diagnosis:        Diagnosis Orders   1. Chronic left hip pain                   Carly has a compression/extension back issue. we     X = TO BE PERFORMED NEXT VISIT  > = PROGRESS TO THIS     S: See eval  O: Discussed anatomy, physiology, body mechanics, principles of loading, and progressive loading/activity.  Reviewed home exercise program extensively; written copy provided.      Access Code: [email or web address]  URL: [email or web address]  Date: 10/03/2024  Prepared by: Domingo Linares     Exercises  - Supine Lower Trunk Rotation  - 2 x daily - 7 x weekly - 2 sets - 20 reps  - Hooklying Single Knee to Chest  - 2 x daily - 7 x weekly - 5 reps - 5 sec hold  - Supine Posterior Pelvic Tilt  - 2 x daily - 7 x weekly - 2 sets - 15 reps - 3 sec hold  - Supine Double Knee to Chest  - 2 x daily - 7 x weekly - 5 reps - 5 sec hold  - Supine Bridge  - 2 x daily - 7 x weekly - 2 sets - 15 reps     Time 1300       Visit 3 Repeat outcome measure at mid point and end.     Pain Pain with activity 3/10       ROM         Modalities Use sparingly if at all.  Prefer an active program.       MH / Ice + ES     MO   Traction      MO             Manual         Sidelying frontal and transverse plane lumbar mobilizations with soft tissue mobilization      MT   ROM         Hooklying PPT 2 x 15 w/ 3 sec hold   NR   SKTC 5 sec hold x 5 reps   TE   DKTC 5 sec hold    TE   Seated flexion 3 x 10sec   TE   bridging 2 x 15 w/ 3 sec hold   TE             Exercise         PPT Progression (supine > sitting > standing against wall > standing)     NR   Crunches with PPT     NR   Mat marches with PPT     NR                       ROWS: H     TE   ROWS: M  Reach and Pull

## 2024-10-08 ENCOUNTER — TREATMENT (OUTPATIENT)
Dept: PHYSICAL THERAPY | Age: 59
End: 2024-10-08
Payer: COMMERCIAL

## 2024-10-08 DIAGNOSIS — M25.552 CHRONIC LEFT HIP PAIN: Primary | ICD-10-CM

## 2024-10-08 DIAGNOSIS — G89.29 CHRONIC LEFT HIP PAIN: Primary | ICD-10-CM

## 2024-10-08 PROCEDURE — 97110 THERAPEUTIC EXERCISES: CPT | Performed by: PHYSICAL THERAPIST

## 2024-10-08 NOTE — PROGRESS NOTES
Physical Therapy Treatment Note    Date: 10/8/2024  Patient Name: Carly Sibley  : 1965   MRN: [id number]  DOInjury:  insidious onset  DOSx: -  Referring Provider: HANS Hanson [place] [address]     Medical Diagnosis:      Diagnosis Orders   1. Chronic left hip pain              Carly has a compression/extension back issue. we    X = TO BE PERFORMED NEXT VISIT  > = PROGRESS TO THIS    S: See eval  O: Discussed anatomy, physiology, body mechanics, principles of loading, and progressive loading/activity.  Reviewed home exercise program extensively; written copy provided.     Access Code: [email or web address]  URL: [email or web address]  Date: 10/03/2024  Prepared by: Domingo Linares    Exercises  - Supine Lower Trunk Rotation  - 2 x daily - 7 x weekly - 2 sets - 20 reps  - Hooklying Single Knee to Chest  - 2 x daily - 7 x weekly - 5 reps - 5 sec hold  - Supine Posterior Pelvic Tilt  - 2 x daily - 7 x weekly - 2 sets - 15 reps - 3 sec hold  - Supine Double Knee to Chest  - 2 x daily - 7 x weekly - 5 reps - 5 sec hold  - Supine Bridge  - 2 x daily - 7 x weekly - 2 sets - 15 reps    Time 1300     Visit 3 Repeat outcome measure at mid point and end.    Pain Pain with activity 3/10     ROM      Modalities Use sparingly if at all.  Prefer an active program.     MH / Ice + ES   MO   Traction    MO         Manual      Sidelying frontal and transverse plane lumbar mobilizations with soft tissue mobilization    MT   ROM      Hooklying PPT 2 x 15 w/ 3 sec hold  NR   SKTC 5 sec hold x 5 reps  TE   DKTC 5 sec hold   TE   Seated flexion 3 x 10sec  TE   bridging 2 x 15 w/ 3 sec hold  TE         Exercise      PPT Progression (supine > sitting > standing against wall > standing)   NR   Crunches with PPT   NR   Mat marches with PPT   NR               ROWS: H   TE   ROWS: M  Reach and Pull   TE   ROWS: L   Reach and Pull   TE   Tubing Pushes   TE   Standing tubing crunch   TE   Corebuilder    NR

## 2024-10-10 ENCOUNTER — TREATMENT (OUTPATIENT)
Dept: PHYSICAL THERAPY | Age: 59
End: 2024-10-10

## 2024-10-10 DIAGNOSIS — M25.552 CHRONIC LEFT HIP PAIN: Primary | ICD-10-CM

## 2024-10-10 DIAGNOSIS — G89.29 CHRONIC LEFT HIP PAIN: Primary | ICD-10-CM

## 2024-10-10 NOTE — PROGRESS NOTES
Physical Therapy Treatment Note    Date: 10/10/2024  Patient Name: Carly Sibley  : 1965   MRN: [id number]  DOInjury:  insidious onset  DOSx: -  Referring Provider: HANS [place] [address]     Medical Diagnosis:   M25.552, G89.29 (ICD-10-CM) - Chronic left hip pain  M46.1 (ICD-10-CM) - Arthritis of left sacroiliac joint (HCC)    Carly has a compression/extension back issue. we    X = TO BE PERFORMED NEXT VISIT  > = PROGRESS TO THIS    S: See eval  O: Discussed anatomy, physiology, body mechanics, principles of loading, and progressive loading/activity.  Reviewed home exercise program extensively; written copy provided.     Access Code: [email or web address]  URL: [email or web address]  Date: 10/03/2024  Prepared by: Domingo Linares    Exercises  - Supine Lower Trunk Rotation  - 2 x daily - 7 x weekly - 2 sets - 20 reps  - Hooklying Single Knee to Chest  - 2 x daily - 7 x weekly - 5 reps - 5 sec hold  - Supine Posterior Pelvic Tilt  - 2 x daily - 7 x weekly - 2 sets - 15 reps - 3 sec hold  - Supine Double Knee to Chest  - 2 x daily - 7 x weekly - 5 reps - 5 sec hold  - Supine Bridge  - 2 x daily - 7 x weekly - 2 sets - 15 reps    Time 1300     Visit 3 Repeat outcome measure at mid point and end.    Pain Pain with activity 3/10     ROM      Modalities Use sparingly if at all.  Prefer an active program.     MH / Ice + ES   MO   Traction    MO         Manual      Sidelying frontal and transverse plane lumbar mobilizations with soft tissue mobilization    MT   ROM      Hooklying PPT 2 x 15 w/ 3 sec hold  NR   SKTC 5 sec hold x 5 reps  TE   DKTC 5 sec hold   TE   Seated flexion 3 x 10sec  TE   bridging 2 x 15 w/ 3 sec hold  TE         Exercise      PPT Progression (supine > sitting > standing against wall > standing)   NR   Crunches with PPT   NR   Mat marches with PPT   NR               ROWS: H   TE   ROWS: M  Reach and Pull 3 x 15 morse  TE   ROWS: L   Reach and Pull   TE

## 2024-10-17 ENCOUNTER — TREATMENT (OUTPATIENT)
Dept: PHYSICAL THERAPY | Age: 59
End: 2024-10-17
Payer: COMMERCIAL

## 2024-10-17 DIAGNOSIS — M25.552 CHRONIC LEFT HIP PAIN: Primary | ICD-10-CM

## 2024-10-17 DIAGNOSIS — G89.29 CHRONIC LEFT HIP PAIN: Primary | ICD-10-CM

## 2024-10-17 PROCEDURE — 97110 THERAPEUTIC EXERCISES: CPT | Performed by: PHYSICAL THERAPIST

## 2024-10-17 NOTE — PROGRESS NOTES
Physical Therapy Treatment Note    Date: 10/17/2024  Patient Name: Carly Sibley  : 1965   MRN: [id number]  DOInjury:  insidious onset  DOSx: -  Referring Provider: HANS Hanson [place] [address]     Medical Diagnosis:   M25.552, G89.29 (ICD-10-CM) - Chronic left hip pain  M46.1 (ICD-10-CM) - Arthritis of left sacroiliac joint (HCC)    Carly has a compression/extension back issue. we    X = TO BE PERFORMED NEXT VISIT  > = PROGRESS TO THIS    S: See eval  O: Discussed anatomy, physiology, body mechanics, principles of loading, and progressive loading/activity.  Reviewed home exercise program extensively; written copy provided.     Access Code: [email or web address]  URL: [email or web address]  Date: 10/17/2024  Prepared by: Domingo Linares    Exercises  - Supine Lower Trunk Rotation  - 2 x daily - 7 x weekly - 1-2 sets - 20 reps  - Hooklying Single Knee to Chest  - 2 x daily - 7 x weekly - 5 reps - 5 sec hold  - Supine Double Knee to Chest  - 2 x daily - 7 x weekly - 5 reps - 5 sec hold  - Supine Posterior Pelvic Tilt  - 2 x daily - 7 x weekly - 2 sets - 15 reps - 3 sec hold  - Supine Bridge  - 2 x daily - 7 x weekly - 2 sets - 15 reps - 3 sec hold  - Split Stance Shoulder Row with Resistance  - 1 x daily - 7 x weekly - 4 sets - 15 reps  - Pushing Simulation with Anchored Resistance  - 1 x daily - 7 x weekly - 4 sets - 10 reps  - Standing Bent Over Low Shoulder Row with Anchored Resistance  - 1 x daily - 7 x weekly - 4 sets - 10 reps    Time 1300     Visit 3 Repeat outcome measure at mid point and end.    Pain Pain with activity 3/10     ROM      Modalities Use sparingly if at all.  Prefer an active program.     MH / Ice + ES   MO   Traction    MO         Manual      Sidelying frontal and transverse plane lumbar mobilizations with soft tissue mobilization    MT   ROM      Hooklying PPT 2 x 15 w/ 3 sec hold  NR   SKTC 5 sec hold x 5 reps  TE   DKTC 5 sec hold   TE   Seated flexion 3

## 2024-10-22 ENCOUNTER — HOSPITAL ENCOUNTER (OUTPATIENT)
Dept: GENERAL RADIOLOGY | Age: 59
Discharge: HOME | End: 2024-10-24
Payer: COMMERCIAL

## 2024-10-22 ENCOUNTER — TREATMENT (OUTPATIENT)
Dept: PHYSICAL THERAPY | Age: 59
End: 2024-10-22

## 2024-10-22 ENCOUNTER — HOSPITAL ENCOUNTER (OUTPATIENT)
Age: 59
Discharge: HOME | End: 2024-10-24
Payer: COMMERCIAL

## 2024-10-22 DIAGNOSIS — M25.552 CHRONIC LEFT HIP PAIN: Primary | ICD-10-CM

## 2024-10-22 DIAGNOSIS — G89.29 CHRONIC LEFT HIP PAIN: Primary | ICD-10-CM

## 2024-10-22 DIAGNOSIS — M79.675 PAIN IN TOE OF LEFT FOOT: ICD-10-CM

## 2024-10-22 PROCEDURE — 73630 X-RAY EXAM OF FOOT: CPT

## 2024-10-22 NOTE — PROGRESS NOTES
Physical Therapy Treatment Note    Date: 10/22/2024  Patient Name: Carly Sibley  : 1965   MRN: [id number]  DOInjury:  insidious onset  DOSx: -  Referring Provider: HANS [place]on [place] [address]     Medical Diagnosis:   M25.552, G89.29 (ICD-10-CM) - Chronic left hip pain  M46.1 (ICD-10-CM) - Arthritis of left sacroiliac joint (HCC)    Carly has a compression/extension back issue. we    X = TO BE PERFORMED NEXT VISIT  > = PROGRESS TO THIS    S: feels 50% better  O: Discussed anatomy, physiology, body mechanics, principles of loading, and progressive loading/activity.  Reviewed home exercise program extensively; written copy provided.     Access Code: [email or web address]  URL: [email or web address]  Date: 10/17/2024  Prepared by: Domingo Linares    Exercises  - Supine Lower Trunk Rotation  - 2 x daily - 7 x weekly - 1-2 sets - 20 reps  - Hooklying Single Knee to Chest  - 2 x daily - 7 x weekly - 5 reps - 5 sec hold  - Supine Double Knee to Chest  - 2 x daily - 7 x weekly - 5 reps - 5 sec hold  - Supine Posterior Pelvic Tilt  - 2 x daily - 7 x weekly - 2 sets - 15 reps - 3 sec hold  - Supine Bridge  - 2 x daily - 7 x weekly - 2 sets - 15 reps - 3 sec hold  - Split Stance Shoulder Row with Resistance  - 1 x daily - 7 x weekly - 4 sets - 15 reps  - Pushing Simulation with Anchored Resistance  - 1 x daily - 7 x weekly - 4 sets - 10 reps  - Standing Bent Over Low Shoulder Row with Anchored Resistance  - 1 x daily - 7 x weekly - 4 sets - 10 reps    Access Code: [email or web address]  URL: [email or web address]  Date: 10/22/2024  Prepared by: Domingo Linares    Exercises  - Mini Lunge  - 1 x daily - 7 x weekly - 3 sets - 10 reps    Time 8831-1764     Visit 6 Repeat outcome measure at mid point and end.    Pain Pain with activity 3/10     ROM      Modalities Use sparingly if at all.  Prefer an active program.     MH / Ice + ES   MO   Traction    MO         Manual      Sidelying frontal and

## 2024-10-24 ENCOUNTER — TREATMENT (OUTPATIENT)
Dept: PHYSICAL THERAPY | Age: 59
End: 2024-10-24
Payer: COMMERCIAL

## 2024-10-24 DIAGNOSIS — M25.552 CHRONIC LEFT HIP PAIN: Primary | ICD-10-CM

## 2024-10-24 DIAGNOSIS — G89.29 CHRONIC LEFT HIP PAIN: Primary | ICD-10-CM

## 2024-10-24 PROCEDURE — 97110 THERAPEUTIC EXERCISES: CPT | Performed by: PHYSICAL THERAPIST

## 2024-10-24 NOTE — PROGRESS NOTES
Physical Therapy Treatment Note    Date: 10/24/2024  Patient Name: Carly Sibley  : 1965   MRN: [id number]  DOInjury:  insidious onset  DOSx: -  Referring Provider: HANS Hanson [place] [address]     Medical Diagnosis:   M25.552, G89.29 (ICD-10-CM) - Chronic left hip pain  M46.1 (ICD-10-CM) - Arthritis of left sacroiliac joint (HCC)    Carly has a compression/extension back issue. we    X = TO BE PERFORMED NEXT VISIT  > = PROGRESS TO THIS    S: feels 50% better  O: Discussed anatomy, physiology, body mechanics, principles of loading, and progressive loading/activity.  Reviewed home exercise program extensively; written copy provided.     Access Code: [email or web address]  URL: [email or web address]  Date: 10/17/2024  Prepared by: Domingo Linares    Exercises  - Supine Lower Trunk Rotation  - 2 x daily - 7 x weekly - 1-2 sets - 20 reps  - Hooklying Single Knee to Chest  - 2 x daily - 7 x weekly - 5 reps - 5 sec hold  - Supine Double Knee to Chest  - 2 x daily - 7 x weekly - 5 reps - 5 sec hold  - Supine Posterior Pelvic Tilt  - 2 x daily - 7 x weekly - 2 sets - 15 reps - 3 sec hold  - Supine Bridge  - 2 x daily - 7 x weekly - 2 sets - 15 reps - 3 sec hold  - Split Stance Shoulder Row with Resistance  - 1 x daily - 7 x weekly - 4 sets - 15 reps  - Pushing Simulation with Anchored Resistance  - 1 x daily - 7 x weekly - 4 sets - 10 reps  - Standing Bent Over Low Shoulder Row with Anchored Resistance  - 1 x daily - 7 x weekly - 4 sets - 10 reps    Access Code: [email or web address]  URL: [email or web address]  Date: 10/22/2024  Prepared by: Domingo Linares    Exercises  - Mini Lunge  - 1 x daily - 7 x weekly - 3 sets - 10 reps    Time 7332-1554     Visit 6 Repeat outcome measure at mid point and end.    Pain Pain with activity 3/10     ROM      Modalities Use sparingly if at all.  Prefer an active program.     MH / Ice + ES   MO   Traction    MO         Manual      Sidelying frontal and

## 2024-10-31 ENCOUNTER — TREATMENT (OUTPATIENT)
Dept: PHYSICAL THERAPY | Age: 59
End: 2024-10-31

## 2024-10-31 DIAGNOSIS — G89.29 CHRONIC LEFT HIP PAIN: Primary | ICD-10-CM

## 2024-10-31 DIAGNOSIS — M25.552 CHRONIC LEFT HIP PAIN: Primary | ICD-10-CM

## 2024-10-31 NOTE — PROGRESS NOTES
Physical Therapy Treatment Note    Date: 10/31/2024  Patient Name: Carly Sibley  : 1965   MRN: [id number]  DOInjury:  insidious onset  DOSx: -  Referring Provider: HANS [place]on [place] [address]     Medical Diagnosis:   M25.552, G89.29 (ICD-10-CM) - Chronic left hip pain  M46.1 (ICD-10-CM) - Arthritis of left sacroiliac joint (HCC)    Carly has a compression/extension back issue. we    X = TO BE PERFORMED NEXT VISIT  > = PROGRESS TO THIS    S: feels 50% better  O: Discussed anatomy, physiology, body mechanics, principles of loading, and progressive loading/activity.  Reviewed home exercise program extensively; written copy provided.     Access Code: [email or web address]  URL: [email or web address]  Date: 10/17/2024  Prepared by: Domingo Linares    Exercises  - Supine Lower Trunk Rotation  - 2 x daily - 7 x weekly - 1-2 sets - 20 reps  - Hooklying Single Knee to Chest  - 2 x daily - 7 x weekly - 5 reps - 5 sec hold  - Supine Double Knee to Chest  - 2 x daily - 7 x weekly - 5 reps - 5 sec hold  - Supine Posterior Pelvic Tilt  - 2 x daily - 7 x weekly - 2 sets - 15 reps - 3 sec hold  - Supine Bridge  - 2 x daily - 7 x weekly - 2 sets - 15 reps - 3 sec hold  - Split Stance Shoulder Row with Resistance  - 1 x daily - 7 x weekly - 4 sets - 15 reps  - Pushing Simulation with Anchored Resistance  - 1 x daily - 7 x weekly - 4 sets - 10 reps  - Standing Bent Over Low Shoulder Row with Anchored Resistance  - 1 x daily - 7 x weekly - 4 sets - 10 reps    Access Code: [email or web address]  URL: [email or web address]  Date: 10/22/2024  Prepared by: Domingo Linares    Exercises  - Mini Lunge  - 1 x daily - 7 x weekly - 3 sets - 10 reps    Time 6512-1108     Visit 6 Repeat outcome measure at mid point and end.    Pain Pain with activity 3/10     ROM      Modalities Use sparingly if at all.  Prefer an active program.     MH / Ice + ES   MO   Traction    MO         Manual      Sidelying frontal and

## (undated) DEVICE — BASIC PACK: Brand: CONVERTORS

## (undated) DEVICE — GLOVE SURG SZ 65 L12IN FNGR THK94MIL STD WHT LTX FREE

## (undated) DEVICE — TOWEL OR BLUEE 16X26IN ST 8 PACK ORB08 16X26ORTWL

## (undated) DEVICE — GLOVE SURG SZ 8 L12IN FNGR THK94MIL STD WHT LTX FREE

## (undated) DEVICE — GOWN SURG XL SMS FAB NONREINFORCED RAGLAN SLV HK LOOP CLSR

## (undated) DEVICE — SOLUTION IV IRRIG POUR BRL 0.9% SODIUM CHL 2F7124

## (undated) DEVICE — BANDAGE GZ W2XL75IN ST RAYON POLY CNFRM STRTCH LTWT

## (undated) DEVICE — TRAY,SKIN SCRUB,DRY,W/GAUZE: Brand: MEDLINE

## (undated) DEVICE — INTENDED FOR TISSUE SEPARATION, AND OTHER PROCEDURES THAT REQUIRE A SHARP SURGICAL BLADE TO PUNCTURE OR CUT.: Brand: BARD-PARKER ® STAINLESS STEEL BLADES

## (undated) DEVICE — PEN: MARKING STD 100/CS: Brand: MEDICAL ACTION INDUSTRIES

## (undated) DEVICE — DRESSING GZ XRFRM 4X4(25/BX 6BX/CS)

## (undated) DEVICE — CHLORAPREP 26ML ORANGE

## (undated) DEVICE — TIBURON EXTREMITY SHEET: Brand: CONVERTORS

## (undated) DEVICE — SUTURE PROL SZ 6-0 L18IN NONABSORBABLE BLU L16MM PS-3 3/8 8680G

## (undated) DEVICE — HOOK LOCK LATEX FREE ELASTIC BANDAGE 2INX5YD

## (undated) DEVICE — 1810 FOAM BLOCK NEEDLE COUNTER: Brand: DEVON

## (undated) DEVICE — GAUZE,SPONGE,4"X4",16PLY,XRAY,STRL,LF: Brand: MEDLINE

## (undated) DEVICE — PREP TRAY 10X5X2: Brand: MEDLINE INDUSTRIES, INC.

## (undated) DEVICE — HANDLE CVR PATENTED RETENTION DISC STRL LIGHT SHLD

## (undated) DEVICE — DOUBLE BASIN SET: Brand: MEDLINE INDUSTRIES, INC.

## (undated) DEVICE — BANDAGE COMPR W4XL108IN WHT LAYERED NO CLSR SYN RUB ESMARCH

## (undated) DEVICE — SOLUTION SURG PREP ANTIMICROBIAL 4 OZ SKIN WND EXIDINE

## (undated) DEVICE — SOLUTION IRRIG 1000ML 09% SOD CHL USP PIC PLAS CONTAINER

## (undated) DEVICE — 20 ML SYRINGE REGULAR TIP: Brand: MONOJECT

## (undated) DEVICE — GLOVE,SURG,SENSICARE,ALOE,LF,PF,7: Brand: MEDLINE